# Patient Record
Sex: MALE | Race: WHITE | NOT HISPANIC OR LATINO | Employment: OTHER | URBAN - NONMETROPOLITAN AREA
[De-identification: names, ages, dates, MRNs, and addresses within clinical notes are randomized per-mention and may not be internally consistent; named-entity substitution may affect disease eponyms.]

---

## 2017-11-27 ENCOUNTER — APPOINTMENT (EMERGENCY)
Dept: ULTRASOUND IMAGING | Facility: HOSPITAL | Age: 82
End: 2017-11-27
Payer: MEDICARE

## 2017-11-27 ENCOUNTER — APPOINTMENT (EMERGENCY)
Dept: RADIOLOGY | Facility: HOSPITAL | Age: 82
End: 2017-11-27
Payer: MEDICARE

## 2017-11-27 ENCOUNTER — HOSPITAL ENCOUNTER (EMERGENCY)
Facility: HOSPITAL | Age: 82
Discharge: HOME | End: 2017-11-27
Admitting: EMERGENCY MEDICINE
Payer: MEDICARE

## 2017-11-27 VITALS
TEMPERATURE: 97.3 F | SYSTOLIC BLOOD PRESSURE: 126 MMHG | WEIGHT: 171.52 LBS | OXYGEN SATURATION: 96 % | DIASTOLIC BLOOD PRESSURE: 64 MMHG | RESPIRATION RATE: 18 BRPM | HEART RATE: 70 BPM

## 2017-11-27 DIAGNOSIS — S93.402A LEFT ANKLE SPRAIN: Primary | ICD-10-CM

## 2017-11-27 PROCEDURE — 73610 X-RAY EXAM OF ANKLE: CPT

## 2017-11-27 PROCEDURE — 99284 EMERGENCY DEPT VISIT MOD MDM: CPT

## 2017-11-27 PROCEDURE — 93971 EXTREMITY STUDY: CPT

## 2017-11-27 PROCEDURE — 73590 X-RAY EXAM OF LOWER LEG: CPT

## 2017-11-27 RX ORDER — ACETAMINOPHEN 325 MG/1
650 TABLET ORAL EVERY 6 HOURS PRN
Qty: 30 TABLET | Refills: 0 | Status: SHIPPED | OUTPATIENT
Start: 2017-11-27

## 2017-11-27 RX ORDER — ACETAMINOPHEN 325 MG/1
650 TABLET ORAL ONCE
Status: DISCONTINUED | OUTPATIENT
Start: 2017-11-27 | End: 2017-11-27 | Stop reason: HOSPADM

## 2017-11-27 NOTE — DISCHARGE INSTRUCTIONS

## 2017-12-01 NOTE — ED PROVIDER NOTES
History  Chief Complaint   Patient presents with    Foot Pain     Complains of left foot pain, unknown injury  Notable swelling up to left calf     51-year-old male presents from the assisted living with his family member for evaluation of left foot and lower leg swelling and pain  Onset is unclear, seems gradual over the past 1-2 weeks, unsure if possible injury  Pain is intermittent particularly with weight-bearing, he is ambulatory with a walker at baseline and has been continuing to do so recently  Nothing makes the pain or swelling better or worse  He has not had any treatment or evaluation for this prior  No prior history of fracture, gout, DVT, cellulitis  Patient will be evaluated for occult fracture given the unclear history possible injury, as well as Doppler study for evaluating the vasculature of the lower extremity given the swelling  Treat pain symptomatically and mobilize and or wrap as appropriate after x-rays are finalized  History provided by:  Patient      None       Past Medical History:   Diagnosis Date    Dementia        Past Surgical History:   Procedure Laterality Date    TOTAL HIP ARTHROPLASTY         History reviewed  No pertinent family history  I have reviewed and agree with the history as documented  Social History   Substance Use Topics    Smoking status: Former Smoker    Smokeless tobacco: Never Used    Alcohol use No        Review of Systems   Constitutional: Negative for appetite change, chills, diaphoresis, fatigue, fever and unexpected weight change  HENT: Negative for congestion, ear pain, facial swelling, hearing loss, rhinorrhea, sinus pressure, sore throat and tinnitus  Eyes: Negative for photophobia, pain, redness, itching and visual disturbance  Respiratory: Negative for cough, chest tightness and wheezing  Cardiovascular: Positive for leg swelling  Negative for chest pain and palpitations     Gastrointestinal: Negative for abdominal pain, constipation, diarrhea, nausea and vomiting  Genitourinary: Negative for dysuria, flank pain, frequency, hematuria, testicular pain and urgency  Musculoskeletal: Positive for joint swelling  Negative for arthralgias, back pain, gait problem and myalgias  Skin: Negative for rash and wound  Neurological: Negative for dizziness, tremors, syncope and headaches  Physical Exam  ED Triage Vitals [11/27/17 1135]   Temperature Pulse Respirations Blood Pressure SpO2   (!) 97 3 °F (36 3 °C) 70 18 126/64 96 %      Temp Source Heart Rate Source Patient Position - Orthostatic VS BP Location FiO2 (%)   Temporal Monitor Lying Left arm --      Pain Score       6           Orthostatic Vital Signs  Vitals:    11/27/17 1135   BP: 126/64   Pulse: 70   Patient Position - Orthostatic VS: Lying       Physical Exam   Constitutional: He is oriented to person, place, and time  He appears well-developed and well-nourished  No distress  HENT:   Head: Normocephalic and atraumatic  Mouth/Throat: Oropharynx is clear and moist    [place]   Eyes: Conjunctivae are normal  Pupils are equal, round, and reactive to light  Cardiovascular: Normal rate, regular rhythm, normal heart sounds and intact distal pulses  Pulmonary/Chest: Effort normal and breath sounds normal  No respiratory distress  He exhibits no tenderness  Abdominal: Soft  Bowel sounds are normal    Musculoskeletal:   Left foot and lower leg swelling up to pretibial area, tenderness posterior and lateral ankle  Sensory and motor intact  Good cap refill  No erythema or warmth  Neurological: He is alert and oriented to person, place, and time  Skin: Skin is warm and dry  Capillary refill takes less than 2 seconds  He is not diaphoretic  No erythema  No pallor  Psychiatric: He has a normal mood and affect  Nursing note and vitals reviewed        ED Medications  Medications - No data to display    Diagnostic Studies  Results Reviewed     None                 XR tibia fibula 2 views LEFT   Final Result by NUNU Jennings MD (11/27 1502)      No acute osseous abnormality  Soft tissue swelling, lower leg  Workstation performed: [id number]         VAS lower limb venous duplex study, unilateral/limited   Final Result by Viv Shearer MD (11/27 1432)      XR ankle 3+ views LEFT   Final Result by NUNU Jennings MD (11/27 1228)      No acute osseous abnormality  Bimalleolar soft tissue swelling  Workstation performed: [id number]                    Procedures  Procedures   Splint applied by RN and/or ED Tech  CMS intact checked by me pre and post splint application  Phone Contacts  ED Phone Contact    ED Course  ED Course as of Nov 30 2011 Mon Nov 27, 2017   1318 Negative for dvt VAS lower limb venous duplex study, unilateral/limited     MDM  Number of Diagnoses or Management Options  Left ankle sprain: new and requires workup     Amount and/or Complexity of Data Reviewed  Tests in the radiology section of CPT®: ordered and reviewed      CritCare Time    Disposition  Final diagnoses:   Left ankle sprain     Time reflects when diagnosis was documented in both MDM as applicable and the Disposition within this note     Time User Action Codes Description Comment    11/27/2017  2:00 PM Cortez Lilia Add [[id number]] Left ankle sprain       ED Disposition     ED Disposition Condition Comment    Discharge  [facility] discharge to home/self care      Condition at discharge: Good        Follow-up Information     Follow up With Specialties Details Why Ifeanyi An MD   Seen in ER need followup for injury [address]  [phone]          Discharge Medication List as of 11/27/2017  2:00 PM      START taking these medications    Details   acetaminophen (TYLENOL) 325 mg tablet Take 2 tablets by mouth every 6 (six) hours as needed for mild pain, moderate pain or severe pain, Starting Mon 11/27/2017, Print           No discharge procedures on file      ED Provider  Electronically Signed by           Cooper Millna PA-C  11/30/17 2017